# Patient Record
Sex: MALE | Race: WHITE
[De-identification: names, ages, dates, MRNs, and addresses within clinical notes are randomized per-mention and may not be internally consistent; named-entity substitution may affect disease eponyms.]

---
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
## 2019-06-07 ENCOUNTER — HOSPITAL ENCOUNTER (EMERGENCY)
Dept: HOSPITAL 26 - MED | Age: 19
Discharge: HOME | End: 2019-06-07
Payer: MEDICAID

## 2019-06-07 VITALS — DIASTOLIC BLOOD PRESSURE: 83 MMHG | SYSTOLIC BLOOD PRESSURE: 129 MMHG

## 2019-06-07 VITALS — DIASTOLIC BLOOD PRESSURE: 86 MMHG | SYSTOLIC BLOOD PRESSURE: 125 MMHG

## 2019-06-07 VITALS — BODY MASS INDEX: 23.77 KG/M2 | HEIGHT: 72 IN | WEIGHT: 175.5 LBS

## 2019-06-07 DIAGNOSIS — Z88.1: ICD-10-CM

## 2019-06-07 DIAGNOSIS — F11.10: ICD-10-CM

## 2019-06-07 DIAGNOSIS — L02.414: Primary | ICD-10-CM

## 2019-06-07 PROCEDURE — 99283 EMERGENCY DEPT VISIT LOW MDM: CPT

## 2019-06-07 PROCEDURE — 96372 THER/PROPH/DIAG INJ SC/IM: CPT

## 2019-06-07 NOTE — NUR
C/O ABCESS LIKE BUMP TO L WRIST X4 WEEKS. PT REPORTS IV HEROIN USE, STATES HE 
THINKS HE "MISSED" AND THATS WHAT CAUSED THE BUMP. DENIES N/V/D/FEVER. L WRIST 
HAS A REDDENED/SWOLLEN ABCESS LIKE BUMP. BED IN LOW POSITION, SIDE RAIL UP X1

## 2019-06-07 NOTE — NUR
Patient discharged with v/s stable. Written and verbal after care instructions 
given and explained. 

Patient alert, oriented and verbalized understanding of instructions. 
Ambulatory with steady gait. All questions addressed prior to discharge. ID 
band removed. Patient advised to follow up with PMD. Rx of CLINDAMYCIN, BACTRIM 
given. Patient educated on indication of medication including possible reaction 
and side effects. Opportunity to ask questions provided and answered.

## 2019-06-13 ENCOUNTER — HOSPITAL ENCOUNTER (EMERGENCY)
Dept: HOSPITAL 26 - MED | Age: 19
Discharge: LEFT BEFORE DISCHARGE | End: 2019-06-13
Payer: MEDICAID

## 2019-06-13 VITALS — HEIGHT: 71 IN | BODY MASS INDEX: 24.36 KG/M2 | WEIGHT: 174 LBS

## 2019-06-13 VITALS — DIASTOLIC BLOOD PRESSURE: 71 MMHG | SYSTOLIC BLOOD PRESSURE: 119 MMHG

## 2019-06-13 DIAGNOSIS — L02.414: Primary | ICD-10-CM

## 2019-06-13 DIAGNOSIS — Z53.21: ICD-10-CM

## 2019-06-13 NOTE — NUR
PT STATES HE IS LEAVING BECAUSE HE HAS TO BE AT WORK THIS EVENING. PT LWBS. DR. MAGDALENO MADE AWARE.

## 2019-06-13 NOTE — NUR
PT TO ED WITH C/O LEFT WRIST ABSCESS X 1 MONTH. 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 
POSSIBLY MISSING A VEIN. PT SEEN IN ED X 6 DAYS AGO FOR SAME ISSUE GIVEN RX ABX 
WITH NO RELIEF. NO DRAINAGE FROM SITE. SITE IS REDDENED. PT PLACED INTO BED, 
PENDING MD FULTON.

## 2020-01-11 ENCOUNTER — HOSPITAL ENCOUNTER (EMERGENCY)
Dept: HOSPITAL 26 - MED | Age: 20
Discharge: HOME | End: 2020-01-11
Payer: COMMERCIAL

## 2020-01-11 VITALS — SYSTOLIC BLOOD PRESSURE: 134 MMHG | DIASTOLIC BLOOD PRESSURE: 76 MMHG

## 2020-01-11 VITALS — BODY MASS INDEX: 23.33 KG/M2 | HEIGHT: 72 IN | WEIGHT: 172.25 LBS

## 2020-01-11 VITALS — DIASTOLIC BLOOD PRESSURE: 76 MMHG | SYSTOLIC BLOOD PRESSURE: 134 MMHG

## 2020-01-11 DIAGNOSIS — Z88.0: ICD-10-CM

## 2020-01-11 DIAGNOSIS — L03.011: Primary | ICD-10-CM

## 2020-01-11 DIAGNOSIS — I10: ICD-10-CM

## 2020-01-11 DIAGNOSIS — Z88.1: ICD-10-CM

## 2020-01-11 NOTE — NUR
PT DISCHARGED WITH PAPERWORK. EDUCATED PT REGARDING MEDICATIONS AND D/C 
INSTRUCTIONS. PT VERBALIZED UNDERSTANDING OF TEACHING. TOLD PT TO FOLLOW UP 
WITH PCP AND WHEN TO RETURN TO ED. PT AT STABLE CONDITION. ALL QUESTIONS 
ANSWERED.

## 2020-01-11 NOTE — NUR
20 Y/O MALE PRESENTS TO ED, C/O RIGHT RING FINGER PAIN 9/10. PT STATES 
INJECTING HEROIN ON RIGHT RING FINGER THAT CAUSED ABSCESS. PT STATES HAVING 
DRAINAGE ON ABSCESS; NO DRAINAGE NOTED DURING ASSESSMENT. PT IS AFEBRILE. NO 
MEDICATION TAKEN PRIOR COMING TO ED. PT VSS. ERMD AWARE. WILL CONTINUE TO 
MONITOR.

## 2020-04-25 ENCOUNTER — HOSPITAL ENCOUNTER (EMERGENCY)
Dept: HOSPITAL 26 - MED | Age: 20
Discharge: HOME | End: 2020-04-25
Payer: MEDICAID

## 2020-04-25 VITALS — HEIGHT: 71 IN | WEIGHT: 165 LBS | BODY MASS INDEX: 23.1 KG/M2

## 2020-04-25 VITALS — SYSTOLIC BLOOD PRESSURE: 127 MMHG | DIASTOLIC BLOOD PRESSURE: 88 MMHG

## 2020-04-25 DIAGNOSIS — L02.414: ICD-10-CM

## 2020-04-25 DIAGNOSIS — F12.90: ICD-10-CM

## 2020-04-25 DIAGNOSIS — L02.413: Primary | ICD-10-CM

## 2020-04-25 DIAGNOSIS — Z88.1: ICD-10-CM

## 2020-04-25 NOTE — NUR
RECEVIED A 19/M FROM TRIAGE WITH C/O BILATERAL ABSCESSES TO ARMS SECONDARY TO 
HEROIN USE. RED, RAISED AREAS OF SKIN NOTED WITH BLACK-COLORED TISSUE. DR DELVALLE AWARE. IN BED FOR [facility]. NEGATIVE COVID SCREEEN.

## 2020-04-25 NOTE — NUR
Patient discharged with v/s stable. Written and verbal after care instructions 
given and explained. 

Patient alert, oriented and verbalized understanding of instructions. 
Ambulatory with steady gait. All questions addressed prior to discharge. ID 
band removed. Patient advised to follow up with PMD. Rx of KEFELX, BACTRIM 
given. Patient educated on indication of medication including possible reaction 
and side effects. Opportunity to ask questions provided and answered.

## 2020-06-06 ENCOUNTER — HOSPITAL ENCOUNTER (EMERGENCY)
Dept: HOSPITAL 26 - MED | Age: 20
Discharge: HOME | End: 2020-06-06
Payer: MEDICAID

## 2020-06-06 VITALS — BODY MASS INDEX: 23.38 KG/M2 | HEIGHT: 71 IN | WEIGHT: 167 LBS

## 2020-06-06 VITALS — DIASTOLIC BLOOD PRESSURE: 69 MMHG | SYSTOLIC BLOOD PRESSURE: 110 MMHG

## 2020-06-06 VITALS — SYSTOLIC BLOOD PRESSURE: 110 MMHG | DIASTOLIC BLOOD PRESSURE: 69 MMHG

## 2020-06-06 DIAGNOSIS — F17.210: ICD-10-CM

## 2020-06-06 DIAGNOSIS — L89.029: Primary | ICD-10-CM

## 2020-06-06 DIAGNOSIS — Z88.1: ICD-10-CM

## 2020-06-06 DIAGNOSIS — A63.8: ICD-10-CM

## 2020-06-06 DIAGNOSIS — L89.019: ICD-10-CM

## 2020-06-06 PROCEDURE — 87491 CHLMYD TRACH DNA AMP PROBE: CPT

## 2020-06-06 PROCEDURE — 96372 THER/PROPH/DIAG INJ SC/IM: CPT

## 2020-06-06 PROCEDURE — 99283 EMERGENCY DEPT VISIT LOW MDM: CPT

## 2020-08-18 ENCOUNTER — HOSPITAL ENCOUNTER (EMERGENCY)
Dept: HOSPITAL 26 - MED | Age: 20
Discharge: LEFT BEFORE DISCHARGE | End: 2020-08-18
Payer: MEDICAID

## 2020-08-18 VITALS — HEIGHT: 71 IN | WEIGHT: 165 LBS | BODY MASS INDEX: 23.1 KG/M2

## 2020-08-18 VITALS — DIASTOLIC BLOOD PRESSURE: 57 MMHG | SYSTOLIC BLOOD PRESSURE: 131 MMHG

## 2020-08-18 DIAGNOSIS — F41.9: ICD-10-CM

## 2020-08-18 DIAGNOSIS — F11.10: ICD-10-CM

## 2020-08-18 DIAGNOSIS — M86.122: Primary | ICD-10-CM

## 2020-08-18 DIAGNOSIS — Z88.1: ICD-10-CM

## 2020-08-18 NOTE — NUR
Patient discharged with v/s stable. Written and verbal after care instructions 
given and explained. 

Patient alert, oriented and verbalized understanding of instructions. 
Ambulatory with steady gait. All questions addressed prior to discharge. ID 
band removed. Patient advised to follow up with PMD. Rx of clindamycin, 
levaquin, narcan given. Patient educated on indication of medication including 
possible reaction and side effects. Opportunity to ask questions provided and 
answered.

## 2020-08-18 NOTE — NUR
-------------------------------------------------------------------------------

            *** Note undone in [facility] - 08/18/20 at 2135 by CESAR ***            

-------------------------------------------------------------------------------

PT TAKEN TO BED 11

## 2020-12-03 ENCOUNTER — HOSPITAL ENCOUNTER (EMERGENCY)
Dept: HOSPITAL 26 - MED | Age: 20
Discharge: HOME | End: 2020-12-03
Payer: COMMERCIAL

## 2020-12-03 VITALS — BODY MASS INDEX: 22.35 KG/M2 | HEIGHT: 72 IN | WEIGHT: 165 LBS

## 2020-12-03 VITALS — DIASTOLIC BLOOD PRESSURE: 75 MMHG | SYSTOLIC BLOOD PRESSURE: 124 MMHG

## 2020-12-03 DIAGNOSIS — Z88.0: ICD-10-CM

## 2020-12-03 DIAGNOSIS — Z48.00: ICD-10-CM

## 2020-12-03 DIAGNOSIS — F11.90: ICD-10-CM

## 2020-12-03 DIAGNOSIS — L03.114: Primary | ICD-10-CM

## 2020-12-03 NOTE — NUR
PT PRESENTS TO THE ED TO HAVE LEFT WOUND IN HIS FOREARM EVALUATED. OPEN WOUND 
NOTED ON THE LEFT FOREARM, NO ACTIVE BLEEDING, SLIGHT SEROUS DRAINANGE NOTED, 
WOUND BED RED. PT REPORTS LAST IV DRUG USE OF HERIONE ABOUT A WEEK AGO. PT 
REPORTS TAKING KEFLEX PO. PT DENIES OTHER MEDICAL HX

## 2020-12-03 NOTE — NUR
Patient discharged with v/s stable. Written and verbal after care instructions 
given and explained. 

Patient alert, oriented and verbalized understanding of instructions. 
Ambulatory with steady gait. All questions addressed prior to discharge. ID 
band removed. Patient advised to follow up with PMD. Rx of KEFLEX AND BACTRIM 
given. Patient educated on indication of medication including possible reaction 
and side effects. Opportunity to ask questions provided and answered.

## 2021-05-09 ENCOUNTER — HOSPITAL ENCOUNTER (EMERGENCY)
Dept: HOSPITAL 26 - MED | Age: 21
Discharge: HOME | End: 2021-05-09
Payer: COMMERCIAL

## 2021-05-09 VITALS — BODY MASS INDEX: 22.35 KG/M2 | HEIGHT: 72 IN | WEIGHT: 165 LBS

## 2021-05-09 VITALS — SYSTOLIC BLOOD PRESSURE: 152 MMHG | DIASTOLIC BLOOD PRESSURE: 98 MMHG

## 2021-05-09 VITALS — DIASTOLIC BLOOD PRESSURE: 98 MMHG | SYSTOLIC BLOOD PRESSURE: 152 MMHG

## 2021-05-09 DIAGNOSIS — K08.89: Primary | ICD-10-CM

## 2021-05-09 DIAGNOSIS — Z79.899: ICD-10-CM

## 2021-05-09 DIAGNOSIS — Z88.1: ICD-10-CM

## 2021-05-09 PROCEDURE — 64400 NJX AA&/STRD TRIGEMINAL NRV: CPT

## 2021-05-09 PROCEDURE — 99284 EMERGENCY DEPT VISIT MOD MDM: CPT

## 2021-05-09 NOTE — NUR
pt d/c with VSS. d/c education given to pt. opportunity to ask questions given 
and answered. rx of clindamycin and motrin given.

## 2021-05-16 ENCOUNTER — HOSPITAL ENCOUNTER (EMERGENCY)
Dept: HOSPITAL 26 - MED | Age: 21
Discharge: HOME | End: 2021-05-16
Payer: COMMERCIAL

## 2021-05-16 VITALS — SYSTOLIC BLOOD PRESSURE: 137 MMHG | DIASTOLIC BLOOD PRESSURE: 79 MMHG

## 2021-05-16 VITALS — BODY MASS INDEX: 23.1 KG/M2 | HEIGHT: 71 IN | WEIGHT: 165 LBS

## 2021-05-16 DIAGNOSIS — K02.9: Primary | ICD-10-CM

## 2021-05-16 PROCEDURE — 99284 EMERGENCY DEPT VISIT MOD MDM: CPT

## 2021-05-16 PROCEDURE — 64400 NJX AA&/STRD TRIGEMINAL NRV: CPT

## 2021-09-14 ENCOUNTER — HOSPITAL ENCOUNTER (EMERGENCY)
Dept: HOSPITAL 26 - MED | Age: 21
Discharge: HOME | End: 2021-09-14
Payer: COMMERCIAL

## 2021-09-14 VITALS — SYSTOLIC BLOOD PRESSURE: 130 MMHG | DIASTOLIC BLOOD PRESSURE: 83 MMHG

## 2021-09-14 VITALS — HEIGHT: 72 IN | WEIGHT: 179 LBS | BODY MASS INDEX: 24.24 KG/M2

## 2021-09-14 VITALS — DIASTOLIC BLOOD PRESSURE: 83 MMHG | SYSTOLIC BLOOD PRESSURE: 130 MMHG

## 2021-09-14 DIAGNOSIS — K59.00: Primary | ICD-10-CM

## 2021-09-14 DIAGNOSIS — Z88.1: ICD-10-CM

## 2021-09-14 DIAGNOSIS — F17.210: ICD-10-CM

## 2021-09-14 PROCEDURE — 99283 EMERGENCY DEPT VISIT LOW MDM: CPT

## 2021-09-14 NOTE — NUR
PT. IS A 22 Y/O MALE THAT CAME INTO ED WITH C/O OF NAUSEA. PT. STATES YESTERDAY 
HE ATE "BAD FOOD FROM [employer]." PT. STATES THAT HE HAS CONSTIPATION AND 
NAUSEA X 1 DAY. PT. ALSO STATES LOWER ABDOMINAL PAIN AND RATES IT AT 2/10 ON 
THE PAIN SCALE AT THIS TIME.  WHEN ASKED TO DESCRIBE PAIN, PT. STATES "IT FEELS 
LIKE CRAMPING."

## 2021-09-14 NOTE — NUR
Patient discharged with v/s stable. Written and verbal after care instructions 
given and explained. 

Patient alert, oriented and verbalized understanding of instructions. 
Ambulatory with steady gait. All questions addressed prior to discharge. ID 
band removed. Patient advised to follow up with PMD. Rx of MIRALAX AND ZOFRAN 
given. Patient educated on indication of medication including possible reaction 
and side effects. Opportunity to ask questions provided and answered.

## 2021-09-14 NOTE — NUR
-------------------------------------------------------------------------------

            *** Note denaone in EDM - 09/14/21 at 2200 by [email or web address] ***            

-------------------------------------------------------------------------------

PATIENT C/O ABDOMINAL DISCOMFORT X1D. +N. PATIENT ATE SOME "BAD FOOD". HAS BEEN 
TAKING PEIDALYTE. DIFFICULTY WITH BM. PATIENT ON ANTIBX.



PMH DENIES

ALLERGY: AMOXICILLIN, CEPHSIL

## 2023-06-23 ENCOUNTER — HOSPITAL ENCOUNTER (EMERGENCY)
Dept: HOSPITAL 26 - MED | Age: 23
LOS: 1 days | Discharge: HOME | End: 2023-06-24
Payer: MEDICAID

## 2023-06-23 VITALS — HEIGHT: 72 IN | WEIGHT: 245 LBS | BODY MASS INDEX: 33.18 KG/M2

## 2023-06-23 VITALS — SYSTOLIC BLOOD PRESSURE: 140 MMHG | DIASTOLIC BLOOD PRESSURE: 99 MMHG

## 2023-06-23 DIAGNOSIS — R11.2: ICD-10-CM

## 2023-06-23 DIAGNOSIS — M79.10: ICD-10-CM

## 2023-06-23 DIAGNOSIS — Z88.1: ICD-10-CM

## 2023-06-23 DIAGNOSIS — F41.9: Primary | ICD-10-CM

## 2023-06-23 DIAGNOSIS — Z79.899: ICD-10-CM

## 2023-06-23 LAB
ALBUMIN FLD-MCNC: 3.7 G/DL (ref 3.4–5)
ANION GAP SERPL CALCULATED.3IONS-SCNC: 15 MMOL/L (ref 8–16)
AST SERPL-CCNC: 37 U/L (ref 15–37)
BARBITURATES UR QL SCN: NEGATIVE NG/ML
BASOPHILS # BLD AUTO: 0.1 K/UL (ref 0–0.22)
BASOPHILS NFR BLD AUTO: 0.7 % (ref 0–2)
BENZODIAZ UR QL SCN: NEGATIVE NG/ML
BILIRUB SERPL-MCNC: 0.3 MG/DL (ref 0–1)
BUN SERPL-MCNC: 9 MG/DL (ref 7–18)
BZE UR QL SCN: NEGATIVE NG/ML
CANNABINOIDS UR QL SCN: POSITIVE NG/ML
CHLORIDE SERPL-SCNC: 103 MMOL/L (ref 98–107)
CO2 SERPL-SCNC: 23.2 MMOL/L (ref 21–32)
CREAT SERPL-MCNC: 0.7 MG/DL (ref 0.6–1.3)
EOSINOPHIL # BLD AUTO: 0.1 K/UL (ref 0–0.4)
EOSINOPHIL NFR BLD AUTO: 1.1 % (ref 0–4)
ERYTHROCYTE [DISTWIDTH] IN BLOOD BY AUTOMATED COUNT: 13.9 % (ref 11.6–13.7)
GFR SERPL CREATININE-BSD FRML MDRD: 180 ML/MIN (ref 90–?)
GLUCOSE SERPL-MCNC: 105 MG/DL (ref 74–106)
HCT VFR BLD AUTO: 39.4 % (ref 36–52)
HGB BLD-MCNC: 13.6 G/DL (ref 12–18)
LYMPHOCYTES # BLD AUTO: 2 K/UL (ref 2–11.5)
LYMPHOCYTES NFR BLD AUTO: 23.1 % (ref 20.5–51.1)
MCH RBC QN AUTO: 27 PG (ref 27–31)
MCHC RBC AUTO-ENTMCNC: 35 G/DL (ref 33–37)
MCV RBC AUTO: 78.7 FL (ref 80–94)
MONOCYTES # BLD AUTO: 0.8 K/UL (ref 0.8–1)
MONOCYTES NFR BLD AUTO: 9.2 % (ref 1.7–9.3)
NEUTROPHILS # BLD AUTO: 5.7 K/UL (ref 1.8–7.7)
NEUTROPHILS NFR BLD AUTO: 65.9 % (ref 42.2–75.2)
OPIATES UR QL SCN: NEGATIVE NG/ML
PCP UR QL SCN: NEGATIVE NG/ML
PLATELET # BLD AUTO: 300 K/UL (ref 140–450)
POTASSIUM SERPL-SCNC: 4.2 MMOL/L (ref 3.5–5.1)
RBC # BLD AUTO: 5.01 MIL/UL (ref 4.2–6.1)
SODIUM SERPL-SCNC: 137 MMOL/L (ref 136–145)
WBC # BLD AUTO: 8.7 K/UL (ref 4.8–10.8)

## 2023-06-23 PROCEDURE — 83690 ASSAY OF LIPASE: CPT

## 2023-06-23 PROCEDURE — 99283 EMERGENCY DEPT VISIT LOW MDM: CPT

## 2023-06-23 PROCEDURE — 85025 COMPLETE CBC W/AUTO DIFF WBC: CPT

## 2023-06-23 PROCEDURE — 80053 COMPREHEN METABOLIC PANEL: CPT

## 2023-06-23 PROCEDURE — 36415 COLL VENOUS BLD VENIPUNCTURE: CPT

## 2023-06-23 PROCEDURE — 80305 DRUG TEST PRSMV DIR OPT OBS: CPT

## 2023-06-24 VITALS — DIASTOLIC BLOOD PRESSURE: 82 MMHG | SYSTOLIC BLOOD PRESSURE: 128 MMHG

## 2023-06-24 NOTE — NUR
Patient discharged with v/s stable. Written and verbal after care instructions 
given and explained. 

Patient alert, oriented and verbalized understanding of instructions. 
Ambulatory with steady gait. All questions addressed prior to discharge. ID 
band removed. Patient advised to follow up with PMD. Rx of ATARAX, ZOFRAN 
given. Patient educated on indication of medication including possible reaction 
and side effects. Opportunity to ask questions provided and answered.
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
